# Patient Record
Sex: FEMALE | Race: BLACK OR AFRICAN AMERICAN | URBAN - METROPOLITAN AREA
[De-identification: names, ages, dates, MRNs, and addresses within clinical notes are randomized per-mention and may not be internally consistent; named-entity substitution may affect disease eponyms.]

---

## 2021-08-04 ENCOUNTER — EMERGENCY (EMERGENCY)
Facility: HOSPITAL | Age: 41
LOS: 1 days | Discharge: HOME | End: 2021-08-04
Admitting: EMERGENCY MEDICINE
Payer: COMMERCIAL

## 2021-08-04 VITALS
TEMPERATURE: 98 F | SYSTOLIC BLOOD PRESSURE: 121 MMHG | HEART RATE: 84 BPM | OXYGEN SATURATION: 99 % | DIASTOLIC BLOOD PRESSURE: 74 MMHG | RESPIRATION RATE: 16 BRPM

## 2021-08-04 DIAGNOSIS — Z20.822 CONTACT WITH AND (SUSPECTED) EXPOSURE TO COVID-19: ICD-10-CM

## 2021-08-04 LAB — SARS-COV-2 RNA SPEC QL NAA+PROBE: SIGNIFICANT CHANGE UP

## 2021-08-04 PROCEDURE — 99282 EMERGENCY DEPT VISIT SF MDM: CPT

## 2021-08-04 NOTE — ED PROVIDER NOTE - PATIENT PORTAL LINK FT
You can access the FollowMyHealth Patient Portal offered by [facility] by registering at the following website: [email or web address]. By joining [facility]’s FollowMyHealth portal, you will also be able to view your health information using other applications (apps) compatible with our system.

## 2021-08-04 NOTE — ED PROVIDER NOTE - NSFOLLOWUPINSTRUCTIONS_ED_ALL_ED_FT
THE COVID 19 TEST RESULTS  - results may take up to 2-3 days to become available   - if you have consented, you will receive your results electronically   -  you can check [employer] or call [phone] to discuss your results with our nursing staff    Please continue to self quarantine (home isolation) until your result is back and follow instructions accordingly  - positive: complete home isolation for a total of 14 days since day of testing and no more fever with feeling back to baseline   - negative: you will be able to stop home isolation but still practice standard precautions, similar to how you would manage a regular flu/cold.    Return to ER for any worsening symptoms, such as persistent fever >100.4F, shortness of breath, coughing up bloody sputum, chest pain, lethargy, and fainting    Please remember to wash your hands frequently (>20 seconds each time), avoid touching your face, and cover your cough/sneeze.  Always wear a mask when you are outside of your home and practice social distancing.    Only take Tylenol for fever/pain control and avoid NSAIDs (ibuprofen/Advil/Aleve/naproxen) due to potential increased risk of exacerbating COVID-19 infection